# Patient Record
Sex: MALE | URBAN - METROPOLITAN AREA
[De-identification: names, ages, dates, MRNs, and addresses within clinical notes are randomized per-mention and may not be internally consistent; named-entity substitution may affect disease eponyms.]

---

## 2021-01-04 ENCOUNTER — OFFICE VISIT (OUTPATIENT)
Dept: URBAN - METROPOLITAN AREA CLINIC 22 | Facility: CLINIC | Age: 71
End: 2021-01-04
Payer: MEDICARE

## 2021-01-04 DIAGNOSIS — H25.813 COMBINED FORMS OF AGE-RELATED CATARACT, BILATERAL: Primary | ICD-10-CM

## 2021-01-04 DIAGNOSIS — H35.042 RETINAL MICRO-ANEURYSMS, UNSPECIFIED, LEFT EYE: ICD-10-CM

## 2021-01-04 DIAGNOSIS — H52.03 HYPERMETROPIA, BILATERAL: ICD-10-CM

## 2021-01-04 PROCEDURE — 92004 COMPRE OPH EXAM NEW PT 1/>: CPT | Performed by: OPTOMETRIST

## 2021-01-04 ASSESSMENT — INTRAOCULAR PRESSURE
OS: 20
OD: 22

## 2021-01-04 ASSESSMENT — VISUAL ACUITY
OD: 20/20
OS: 20/20

## 2021-01-04 NOTE — IMPRESSION/PLAN
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  Plan: continue to monitor x 2 years hx, reports from Dr. Basia Ch in [place]

## 2021-01-04 NOTE — IMPRESSION/PLAN
Impression: Retinal hemorrhage, left eye: H35.62. Left.  Plan: continue to monitor follow up 3 months for resolve